# Patient Record
Sex: MALE | Race: WHITE | URBAN - METROPOLITAN AREA
[De-identification: names, ages, dates, MRNs, and addresses within clinical notes are randomized per-mention and may not be internally consistent; named-entity substitution may affect disease eponyms.]

---

## 2020-10-07 ENCOUNTER — POST-OPERATIVE VISIT (OUTPATIENT)
Dept: URBAN - METROPOLITAN AREA CLINIC 7 | Facility: CLINIC | Age: 70
End: 2020-10-07
Payer: MEDICARE

## 2020-10-07 PROCEDURE — 99024 POSTOP FOLLOW-UP VISIT: CPT | Performed by: OPHTHALMOLOGY

## 2020-10-07 PROCEDURE — 67228 TREATMENT X10SV RETINOPATHY: CPT | Performed by: OPHTHALMOLOGY

## 2020-10-07 ASSESSMENT — INTRAOCULAR PRESSURE
OS: 18
OD: 14

## 2020-10-07 NOTE — IMPRESSION/PLAN
Impression: S/P S/P 25g PPV, MP EL OD - 28 Days. Plan: OD stable with good VA. No new VH OS, OK for fill-in PRP today. 

photos/FA sweeps/OCT OU on return visit

## 2021-07-06 ENCOUNTER — OFFICE VISIT (OUTPATIENT)
Dept: URBAN - METROPOLITAN AREA CLINIC 27 | Facility: CLINIC | Age: 71
End: 2021-07-06
Payer: MEDICARE

## 2021-07-06 DIAGNOSIS — Z96.1 PRESENCE OF INTRAOCULAR LENS: ICD-10-CM

## 2021-07-06 PROCEDURE — 99214 OFFICE O/P EST MOD 30 MIN: CPT | Performed by: OPHTHALMOLOGY

## 2021-07-06 PROCEDURE — 92134 CPTRZ OPH DX IMG PST SGM RTA: CPT | Performed by: OPHTHALMOLOGY

## 2021-07-06 PROCEDURE — 92235 FLUORESCEIN ANGRPH MLTIFRAME: CPT | Performed by: OPHTHALMOLOGY

## 2021-07-06 ASSESSMENT — INTRAOCULAR PRESSURE
OS: 13
OD: 13

## 2021-07-06 NOTE — IMPRESSION/PLAN
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
s/p sector PRP OD 9/19/19
s/p sector PRP OS 9/25/19
s/p PPV/MP/PRP OD 9/9/20 Plan: Exam/photos/OCT show quiet PDR w/o CSDME OD, active PDR w/o CSDME OS. FA sweeps 7/6/21 shows no recurrent NV OD, recurrent NVE SN/ST/IN/inferior OS. Reviewed options of obs, anti-VEGF, PRP, and PPV; recommend observation OD and fill-in PRP OS. 2-3 weeks, fill-in PRP OS (PHX-B)

## 2021-07-28 ENCOUNTER — PROCEDURE (OUTPATIENT)
Dept: URBAN - METROPOLITAN AREA CLINIC 7 | Facility: CLINIC | Age: 71
End: 2021-07-28
Payer: MEDICARE

## 2021-07-28 PROCEDURE — 67228 TREATMENT X10SV RETINOPATHY: CPT | Performed by: OPHTHALMOLOGY

## 2021-07-28 ASSESSMENT — INTRAOCULAR PRESSURE
OD: 15
OS: 17

## 2021-08-23 ENCOUNTER — OFFICE VISIT (OUTPATIENT)
Dept: URBAN - METROPOLITAN AREA CLINIC 27 | Facility: CLINIC | Age: 71
End: 2021-08-23
Payer: MEDICARE

## 2021-08-23 DIAGNOSIS — E11.3593 TYPE 2 DIABETES MELLITUS WITH PROLIFERATIVE DIABETIC RETINOPATHY WITHOUT MACULAR EDEMA, BILATERAL: ICD-10-CM

## 2021-08-23 DIAGNOSIS — H43.13 VITREOUS HEMORRHAGE, BILATERAL: Primary | ICD-10-CM

## 2021-08-23 PROCEDURE — 92012 INTRM OPH EXAM EST PATIENT: CPT | Performed by: OPHTHALMOLOGY

## 2021-08-23 PROCEDURE — 92134 CPTRZ OPH DX IMG PST SGM RTA: CPT | Performed by: OPHTHALMOLOGY

## 2021-08-23 PROCEDURE — 67028 INJECTION EYE DRUG: CPT | Performed by: OPHTHALMOLOGY

## 2021-08-23 ASSESSMENT — INTRAOCULAR PRESSURE
OS: 16
OD: 13

## 2021-08-23 NOTE — IMPRESSION/PLAN
Impression: Vitreous hemorrhage OU
s/p PPV/MP/PRP OD 9/9/20 Plan: Pt presents emergently c/o new floaters OS x1 day. Exam is c/w recurrent VH despite prior PRP. Reviewed options of obs, Avastin, and Sx; pt elects sample Lucentis OS today. 

photos/FA sweeps/OCT OS>OD, reeval DR on return visit

## 2021-08-23 NOTE — IMPRESSION/PLAN
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
s/p PPV/MP/PRP OD 9/9/21
s/p sector PRP OS 9/25/19 and 7/28/21 Plan: Exam/OCT show quiet PDR w/o CSDME OD, active PDR w/o CSDME OS. FA sweeps 7/6/21 showed no recurrent NV OD, recurrent NVE SN/ST/IN/inferior OS. Plan as above.

## 2022-05-16 ENCOUNTER — OFFICE VISIT (OUTPATIENT)
Dept: URBAN - METROPOLITAN AREA CLINIC 27 | Facility: CLINIC | Age: 72
End: 2022-05-16
Payer: MEDICARE

## 2022-05-16 DIAGNOSIS — E11.3593 TYPE 2 DIABETES MELLITUS WITH PROLIFERATIVE DIABETIC RETINOPATHY WITHOUT MACULAR EDEMA, BILATERAL: ICD-10-CM

## 2022-05-16 DIAGNOSIS — Z96.1 PRESENCE OF INTRAOCULAR LENS: ICD-10-CM

## 2022-05-16 DIAGNOSIS — H43.13 VITREOUS HEMORRHAGE, BILATERAL: Primary | ICD-10-CM

## 2022-05-16 PROCEDURE — 67028 INJECTION EYE DRUG: CPT | Performed by: OPHTHALMOLOGY

## 2022-05-16 PROCEDURE — 92250 FUNDUS PHOTOGRAPHY W/I&R: CPT | Performed by: OPHTHALMOLOGY

## 2022-05-16 PROCEDURE — 99214 OFFICE O/P EST MOD 30 MIN: CPT | Performed by: OPHTHALMOLOGY

## 2022-05-16 PROCEDURE — 92134 CPTRZ OPH DX IMG PST SGM RTA: CPT | Performed by: OPHTHALMOLOGY

## 2022-05-16 PROCEDURE — 92235 FLUORESCEIN ANGRPH MLTIFRAME: CPT | Performed by: OPHTHALMOLOGY

## 2022-05-16 ASSESSMENT — INTRAOCULAR PRESSURE
OS: 18
OD: 13

## 2022-05-16 NOTE — IMPRESSION/PLAN
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
 - s/p PPV/MP/PRP OD 9/9/21
 - s/p sector PRP OS 9/25/19 and 7/28/21 Plan: Exam/OCT show quiet PDR w/o CSDME OD, active PDR w/o CSDME OS. Photos 5/16/22 showed quiet PDR OD, active PDR OS. FA sweeps 5/16/22 showed no recurrent NV OD, recurrent NVE SN/ST/IN/inferior OS. Plan as above.

## 2022-05-16 NOTE — IMPRESSION/PLAN
Impression: Vitreous hemorrhage OU
 - s/p PPV/MP/PRP OD 9/9/20 Plan: Pt presents emergently c/o new floaters OS x1 day. Exam is c/w recurrent VH despite prior PRP. Reviewed options of obs, Avastin, and Sx; pt elects Avastin OS today. 

4 weeks, OCT OU, reeval

## 2022-10-10 ENCOUNTER — OFFICE VISIT (OUTPATIENT)
Dept: URBAN - METROPOLITAN AREA CLINIC 41 | Facility: CLINIC | Age: 72
End: 2022-10-10
Payer: MEDICARE

## 2022-10-10 DIAGNOSIS — E11.3593 TYPE 2 DIABETES MELLITUS WITH PROLIFERATIVE DIABETIC RETINOPATHY WITHOUT MACULAR EDEMA, BILATERAL: ICD-10-CM

## 2022-10-10 DIAGNOSIS — H43.13 VITREOUS HEMORRHAGE, BILATERAL: Primary | ICD-10-CM

## 2022-10-10 DIAGNOSIS — Z96.1 PRESENCE OF INTRAOCULAR LENS: ICD-10-CM

## 2022-10-10 PROCEDURE — 99214 OFFICE O/P EST MOD 30 MIN: CPT | Performed by: OPHTHALMOLOGY

## 2022-10-10 PROCEDURE — 67028 INJECTION EYE DRUG: CPT | Performed by: OPHTHALMOLOGY

## 2022-10-10 PROCEDURE — 92134 CPTRZ OPH DX IMG PST SGM RTA: CPT | Performed by: OPHTHALMOLOGY

## 2022-10-10 ASSESSMENT — INTRAOCULAR PRESSURE
OS: 17
OD: 17

## 2022-10-10 NOTE — IMPRESSION/PLAN
Impression: Vitreous hemorrhage OU
 - s/p PPV/MP/PRP OD 9/9/20
- s/p Avastin OS 5/16/2022 Plan: Patient here at 5 mo instead of 4 wks. Pt presents emergently c/o new floaters OS x1 day after starting new BP medicine. Exam is c/w recurrent VH despite prior PRP. Reviewed options of obs, Avastin, and Sx; pt elects Avastin OS today. Intravitreal Avastin injected OS today without complication. 

4 weeks, OCT OU, reeval DR [facility])

## 2023-07-12 ENCOUNTER — OFFICE VISIT (OUTPATIENT)
Dept: URBAN - METROPOLITAN AREA CLINIC 27 | Facility: CLINIC | Age: 73
End: 2023-07-12
Payer: MEDICARE

## 2023-07-12 DIAGNOSIS — Z96.1 PRESENCE OF INTRAOCULAR LENS: ICD-10-CM

## 2023-07-12 DIAGNOSIS — H43.13 VITREOUS HEMORRHAGE, BILATERAL: Primary | ICD-10-CM

## 2023-07-12 DIAGNOSIS — E11.3593 TYPE 2 DIABETES MELLITUS WITH PROLIFERATIVE DIABETIC RETINOPATHY WITHOUT MACULAR EDEMA, BILATERAL: ICD-10-CM

## 2023-07-12 PROCEDURE — 99214 OFFICE O/P EST MOD 30 MIN: CPT | Performed by: OPHTHALMOLOGY

## 2023-07-12 PROCEDURE — 92134 CPTRZ OPH DX IMG PST SGM RTA: CPT | Performed by: OPHTHALMOLOGY

## 2023-07-12 PROCEDURE — 67028 INJECTION EYE DRUG: CPT | Performed by: OPHTHALMOLOGY

## 2023-07-12 ASSESSMENT — INTRAOCULAR PRESSURE
OD: 15
OS: 16

## 2023-07-12 NOTE — IMPRESSION/PLAN
Impression: Vitreous hemorrhage OU
- s/p PPV/MP/PRP OD 9/9/20
- s/p Avastin OS 5/16/2022 Plan: Pt lost to f/u (lives in [facility]). Pt c/o new floaters OS. Exam shows recurrent VH despite prior PRP. Reviewed options of obs, Avastin, and Sx; pt elects Avastin OS today. 

photos/FA sweeps/OCT OS>OD, reeval DR on return visit